# Patient Record
Sex: MALE | Race: BLACK OR AFRICAN AMERICAN | NOT HISPANIC OR LATINO | Employment: UNEMPLOYED | URBAN - METROPOLITAN AREA
[De-identification: names, ages, dates, MRNs, and addresses within clinical notes are randomized per-mention and may not be internally consistent; named-entity substitution may affect disease eponyms.]

---

## 2023-11-20 ENCOUNTER — HOSPITAL ENCOUNTER (EMERGENCY)
Facility: HOSPITAL | Age: 38
Discharge: HOME | End: 2023-11-20
Attending: EMERGENCY MEDICINE
Payer: MEDICAID

## 2023-11-20 VITALS
BODY MASS INDEX: 21.87 KG/M2 | HEART RATE: 92 BPM | HEIGHT: 73 IN | DIASTOLIC BLOOD PRESSURE: 94 MMHG | TEMPERATURE: 98.5 F | OXYGEN SATURATION: 97 % | WEIGHT: 165 LBS | SYSTOLIC BLOOD PRESSURE: 127 MMHG | RESPIRATION RATE: 18 BRPM

## 2023-11-20 DIAGNOSIS — F14.10: Primary | ICD-10-CM

## 2023-11-20 PROCEDURE — 99285 EMERGENCY DEPT VISIT HI MDM: CPT | Performed by: EMERGENCY MEDICINE

## 2023-11-20 PROCEDURE — 93005 ELECTROCARDIOGRAM TRACING: CPT

## 2023-11-20 PROCEDURE — 99284 EMERGENCY DEPT VISIT MOD MDM: CPT

## 2023-11-20 ASSESSMENT — LIFESTYLE VARIABLES
EVER FELT BAD OR GUILTY ABOUT YOUR DRINKING: NO
HAVE PEOPLE ANNOYED YOU BY CRITICIZING YOUR DRINKING: NO
HAVE YOU EVER FELT YOU SHOULD CUT DOWN ON YOUR DRINKING: NO
EVER HAD A DRINK FIRST THING IN THE MORNING TO STEADY YOUR NERVES TO GET RID OF A HANGOVER: NO
REASON UNABLE TO ASSESS: NO

## 2023-11-20 ASSESSMENT — COLUMBIA-SUICIDE SEVERITY RATING SCALE - C-SSRS
1. IN THE PAST MONTH, HAVE YOU WISHED YOU WERE DEAD OR WISHED YOU COULD GO TO SLEEP AND NOT WAKE UP?: YES
5. HAVE YOU STARTED TO WORK OUT OR WORKED OUT THE DETAILS OF HOW TO KILL YOURSELF? DO YOU INTEND TO CARRY OUT THIS PLAN?: YES
2. HAVE YOU ACTUALLY HAD ANY THOUGHTS OF KILLING YOURSELF?: YES
6. HAVE YOU EVER DONE ANYTHING, STARTED TO DO ANYTHING, OR PREPARED TO DO ANYTHING TO END YOUR LIFE?: NO
4. HAVE YOU HAD THESE THOUGHTS AND HAD SOME INTENTION OF ACTING ON THEM?: YES

## 2023-11-20 ASSESSMENT — PAIN SCALES - GENERAL: PAINLEVEL_OUTOF10: 4

## 2023-11-20 ASSESSMENT — PAIN - FUNCTIONAL ASSESSMENT: PAIN_FUNCTIONAL_ASSESSMENT: 0-10
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
## 2023-11-21 ENCOUNTER — CLINICAL SUPPORT (OUTPATIENT)
Dept: EMERGENCY MEDICINE | Facility: HOSPITAL | Age: 38
End: 2023-11-21
Payer: MEDICAID

## 2023-11-21 LAB
ATRIAL RATE: 73 BPM
P AXIS: 73 DEGREES
P OFFSET: 199 MS
P ONSET: 150 MS
PR INTERVAL: 138 MS
Q ONSET: 219 MS
QRS COUNT: 12 BEATS
QRS DURATION: 146 MS
QT INTERVAL: 410 MS
QTC CALCULATION(BAZETT): 451 MS
QTC FREDERICIA: 438 MS
R AXIS: 102 DEGREES
T AXIS: 54 DEGREES
T OFFSET: 424 MS
VENTRICULAR RATE: 73 BPM

## 2023-12-26 ENCOUNTER — HOSPITAL ENCOUNTER (EMERGENCY)
Facility: HOSPITAL | Age: 38
Discharge: HOME | End: 2023-12-26
Payer: MEDICAID

## 2023-12-26 ENCOUNTER — PHARMACY VISIT (OUTPATIENT)
Dept: PHARMACY | Facility: CLINIC | Age: 38
End: 2023-12-26
Payer: MEDICAID

## 2023-12-26 VITALS
TEMPERATURE: 96.4 F | HEIGHT: 73 IN | BODY MASS INDEX: 23.86 KG/M2 | RESPIRATION RATE: 16 BRPM | OXYGEN SATURATION: 99 % | DIASTOLIC BLOOD PRESSURE: 98 MMHG | HEART RATE: 80 BPM | WEIGHT: 180 LBS | SYSTOLIC BLOOD PRESSURE: 151 MMHG

## 2023-12-26 DIAGNOSIS — K04.7 DENTAL INFECTION: Primary | ICD-10-CM

## 2023-12-26 PROCEDURE — 96372 THER/PROPH/DIAG INJ SC/IM: CPT

## 2023-12-26 PROCEDURE — 2500000001 HC RX 250 WO HCPCS SELF ADMINISTERED DRUGS (ALT 637 FOR MEDICARE OP): Mod: SE

## 2023-12-26 PROCEDURE — 99284 EMERGENCY DEPT VISIT MOD MDM: CPT

## 2023-12-26 PROCEDURE — 99283 EMERGENCY DEPT VISIT LOW MDM: CPT | Mod: 25

## 2023-12-26 PROCEDURE — 2500000004 HC RX 250 GENERAL PHARMACY W/ HCPCS (ALT 636 FOR OP/ED): Mod: SE

## 2023-12-26 PROCEDURE — RXMED WILLOW AMBULATORY MEDICATION CHARGE

## 2023-12-26 RX ORDER — KETOROLAC TROMETHAMINE 30 MG/ML
30 INJECTION, SOLUTION INTRAMUSCULAR; INTRAVENOUS ONCE
Status: COMPLETED | OUTPATIENT
Start: 2023-12-26 | End: 2023-12-26

## 2023-12-26 RX ORDER — AMOXICILLIN AND CLAVULANATE POTASSIUM 875; 125 MG/1; MG/1
1 TABLET, FILM COATED ORAL EVERY 12 HOURS
Qty: 14 TABLET | Refills: 0 | Status: SHIPPED | OUTPATIENT
Start: 2023-12-26 | End: 2024-01-02

## 2023-12-26 RX ORDER — NAPROXEN 500 MG/1
500 TABLET ORAL 2 TIMES DAILY PRN
Qty: 30 TABLET | Refills: 0 | Status: SHIPPED | OUTPATIENT
Start: 2023-12-26

## 2023-12-26 RX ORDER — AMOXICILLIN AND CLAVULANATE POTASSIUM 875; 125 MG/1; MG/1
1 TABLET, FILM COATED ORAL ONCE
Status: COMPLETED | OUTPATIENT
Start: 2023-12-26 | End: 2023-12-26

## 2023-12-26 RX ADMIN — KETOROLAC TROMETHAMINE 30 MG: 30 INJECTION, SOLUTION INTRAMUSCULAR; INTRAVENOUS at 09:19

## 2023-12-26 RX ADMIN — AMOXICILLIN AND CLAVULANATE POTASSIUM 875 MG: 875; 125 TABLET, FILM COATED ORAL at 09:18

## 2023-12-26 ASSESSMENT — COLUMBIA-SUICIDE SEVERITY RATING SCALE - C-SSRS
6. HAVE YOU EVER DONE ANYTHING, STARTED TO DO ANYTHING, OR PREPARED TO DO ANYTHING TO END YOUR LIFE?: NO
2. HAVE YOU ACTUALLY HAD ANY THOUGHTS OF KILLING YOURSELF?: NO
1. IN THE PAST MONTH, HAVE YOU WISHED YOU WERE DEAD OR WISHED YOU COULD GO TO SLEEP AND NOT WAKE UP?: NO

## 2023-12-26 ASSESSMENT — PAIN - FUNCTIONAL ASSESSMENT: PAIN_FUNCTIONAL_ASSESSMENT: 0-10

## 2023-12-26 ASSESSMENT — PAIN SCALES - GENERAL: PAINLEVEL_OUTOF10: 10 - WORST POSSIBLE PAIN

## 2023-12-26 NOTE — ED PROVIDER NOTES
HPI   Chief Complaint   Patient presents with    Dental Pain       This patient is a 38-year-old male with alcohol abuse, cocaine abuse, HTN presenting today for dental pain.  Reports that the pain started yesterday.  Having intermittent chills but no reported fever.  Notes pain in the left lower jaw.  States that his wisdom teeth came in and believes that they are pushing on his other teeth.  Has concerns for infection.  Does report that 1.5 months ago, was seen for dental infection in the same tooth, given antibiotics, but was unable to see a dentist as no one took his insurance.  No issues swallowing or opening his jaw.  Notes small amount of buccal mucosal swelling.  No abscesses.                          No data recorded                Patient History   History reviewed. No pertinent past medical history.  History reviewed. No pertinent surgical history.  No family history on file.  Social History     Tobacco Use    Smoking status: Not on file    Smokeless tobacco: Not on file   Substance Use Topics    Alcohol use: Not on file    Drug use: Not on file       Physical Exam   ED Triage Vitals [12/26/23 0856]   Temp Heart Rate Resp BP   35.8 °C (96.4 °F) 92 16 (!) 171/123      SpO2 Temp Source Heart Rate Source Patient Position   99 % Tympanic Monitor Sitting      BP Location FiO2 (%)     Right arm --       Physical Exam  Vitals and nursing note reviewed.   Constitutional:       General: He is not in acute distress.     Appearance: Normal appearance. He is not ill-appearing.   HENT:      Head: Normocephalic and atraumatic.      Right Ear: External ear normal.      Left Ear: External ear normal.      Nose: Nose normal. No congestion or rhinorrhea.      Mouth/Throat:      Mouth: Mucous membranes are moist.      Dentition: Dental tenderness present. No dental abscesses.      Pharynx: Oropharynx is clear. No oropharyngeal exudate or posterior oropharyngeal erythema.        Comments: Pain on palpation of tooth #18.  No  obvious abscesses.  Multiple dental caries throughout mouth.  Eyes:      Extraocular Movements: Extraocular movements intact.      Conjunctiva/sclera: Conjunctivae normal.      Pupils: Pupils are equal, round, and reactive to light.   Cardiovascular:      Rate and Rhythm: Normal rate and regular rhythm.      Heart sounds: Normal heart sounds.   Pulmonary:      Effort: No accessory muscle usage or respiratory distress.      Breath sounds: Normal breath sounds. No wheezing, rhonchi or rales.   Abdominal:      General: Abdomen is flat. Bowel sounds are normal. There is no distension.      Palpations: Abdomen is soft.      Tenderness: There is no abdominal tenderness. There is no right CVA tenderness or left CVA tenderness.   Musculoskeletal:         General: No swelling or deformity. Normal range of motion.      Cervical back: Normal range of motion and neck supple.      Right lower leg: No edema.      Left lower leg: No edema.   Lymphadenopathy:      Cervical: Cervical adenopathy present.   Skin:     General: Skin is warm and dry.      Capillary Refill: Capillary refill takes less than 2 seconds.   Neurological:      General: No focal deficit present.      Mental Status: He is alert and oriented to person, place, and time.      GCS: GCS eye subscore is 4. GCS verbal subscore is 5. GCS motor subscore is 6.      Cranial Nerves: Cranial nerves 2-12 are intact.      Sensory: No sensory deficit.      Motor: Motor function is intact. No weakness.   Psychiatric:         Mood and Affect: Mood and affect normal.         Speech: Speech normal.         Behavior: Behavior normal. Behavior is cooperative.         ED Course & MDM   Diagnoses as of 12/26/23 0958   Dental infection       Medical Decision Making  Patient is a 38-year-old male presenting today for dental pain.  On exam, there is distinct tenderness on palpation of tooth #18.  Multiple dental caries throughout his mouth including on tooth #18.  Patient notes that he  believes his wisdom teeth are coming in and pressing against his teeth causing pain and possible infection.  On exam, there is no obvious abscesses.  He does have cervical lymphadenopathy on palpation.  Able to fully open his mouth, no trismus, no buccal mucosal swelling or issues swallowing.  He is afebrile, though his blood pressure is extremely elevated at 171/123.  Per chart review, there are prescriptions that were given to him for amlodipine for blood pressure, though he reports that he has never taken any medication for hypertension.  Elevated blood pressure could be due to the pain that this patient is in as he reports a 10 out of 10 pain with radiation along his jaw to his head.  Denies headache, chest pain or shortness of breath.  Will given medication for pain and reevaluate his blood pressure.  If it still is continue to be elevated, can start him on amlodipine and have him follow-up with PCP.    After Toradol, patient's blood pressure improved down to the 150s over 90s.  Given strict return precautions of when to come back to the ED, informed him to start keeping a log of blood pressure, and follow-up with his PCP for further evaluation.  Will send this patient home on Augmentin.  Handout was given for dental clinics that he can be seen at including [facility] dental [facility] clinic.  Pain improved.  Patient is stable and okay for discharge at this time.        Procedure  Procedures     Cherry Neville PA-C  12/26/23 0958

## 2023-12-26 NOTE — ED TRIAGE NOTES
Pt presents to ED for dental pain x 2 months. Pt states that he was supposed to have his L wisdom tooth removed but was unable to. Pt states he feel as if it is reinfected and pushing other teeth. Pt states he took a tylenol at midnight. Pt is Aox3.

## 2023-12-26 NOTE — DISCHARGE INSTRUCTIONS
Please take antibiotics twice a day until done with bottle.  You can take naproxen once every 12 hours as needed.  Do not mix this with ibuprofen as it can cause stomach ulcers though you can also additionally take Tylenol once every 6-8 hours as needed as well.  Please follow-up with dentist once you are done with antibiotics or sooner if you are able to    Your blood pressure was mildly elevated, though it did decrease after pain medication was given to you, so it is likely elevated due to your pain.  It is still mildly elevated at 150s over 90s.  You may need to see a PCP for evaluation of blood pressure.  You can take your blood pressure daily and keep a log with it to bring to see your primary care provider.  If you do not have a primary care provider, you can call [phone] to schedule an appointment to see one.     If you notice fever chills, chest pain or shortness of breath, unable to swallow or fully open your jaw, please come back to the ED for further evaluation.

## 2024-02-16 ENCOUNTER — HOSPITAL ENCOUNTER (EMERGENCY)
Facility: HOSPITAL | Age: 39
Discharge: HOME | End: 2024-02-16
Payer: MEDICAID

## 2024-02-16 VITALS
TEMPERATURE: 97.2 F | DIASTOLIC BLOOD PRESSURE: 81 MMHG | SYSTOLIC BLOOD PRESSURE: 149 MMHG | RESPIRATION RATE: 16 BRPM | HEART RATE: 87 BPM | OXYGEN SATURATION: 98 %

## 2024-02-16 DIAGNOSIS — M54.50 ACUTE EXACERBATION OF CHRONIC LOW BACK PAIN: Primary | ICD-10-CM

## 2024-02-16 DIAGNOSIS — G89.29 ACUTE EXACERBATION OF CHRONIC LOW BACK PAIN: Primary | ICD-10-CM

## 2024-02-16 PROCEDURE — 99284 EMERGENCY DEPT VISIT MOD MDM: CPT | Performed by: PHYSICIAN ASSISTANT

## 2024-02-16 PROCEDURE — 99283 EMERGENCY DEPT VISIT LOW MDM: CPT

## 2024-02-16 PROCEDURE — 2500000001 HC RX 250 WO HCPCS SELF ADMINISTERED DRUGS (ALT 637 FOR MEDICARE OP): Mod: SE | Performed by: PHYSICIAN ASSISTANT

## 2024-02-16 RX ORDER — MULTIVITAMIN
1 TABLET ORAL DAILY
Qty: 30 TABLET | Refills: 0 | Status: SHIPPED | OUTPATIENT
Start: 2024-02-16 | End: 2024-03-17

## 2024-02-16 RX ORDER — IBUPROFEN 600 MG/1
600 TABLET ORAL ONCE
Status: COMPLETED | OUTPATIENT
Start: 2024-02-16 | End: 2024-02-16

## 2024-02-16 RX ORDER — IBUPROFEN 600 MG/1
600 TABLET ORAL EVERY 6 HOURS PRN
Qty: 28 TABLET | Refills: 0 | Status: SHIPPED | OUTPATIENT
Start: 2024-02-16 | End: 2024-02-23

## 2024-02-16 RX ORDER — METHOCARBAMOL 500 MG/1
1000 TABLET, FILM COATED ORAL ONCE
Status: COMPLETED | OUTPATIENT
Start: 2024-02-16 | End: 2024-02-16

## 2024-02-16 RX ORDER — ACETAMINOPHEN 500 MG
1 TABLET ORAL ONCE
Qty: 1 EACH | Refills: 0 | Status: SHIPPED | OUTPATIENT
Start: 2024-02-16 | End: 2024-02-16

## 2024-02-16 RX ORDER — METHOCARBAMOL 500 MG/1
1000 TABLET, FILM COATED ORAL EVERY 8 HOURS PRN
Qty: 30 TABLET | Refills: 0 | Status: SHIPPED | OUTPATIENT
Start: 2024-02-16 | End: 2024-02-26

## 2024-02-16 RX ADMIN — IBUPROFEN 600 MG: 600 TABLET, FILM COATED ORAL at 14:44

## 2024-02-16 RX ADMIN — METHOCARBAMOL 1000 MG: 500 TABLET ORAL at 14:44

## 2024-02-16 ASSESSMENT — LIFESTYLE VARIABLES
EVER FELT BAD OR GUILTY ABOUT YOUR DRINKING: NO
EVER HAD A DRINK FIRST THING IN THE MORNING TO STEADY YOUR NERVES TO GET RID OF A HANGOVER: NO
HAVE YOU EVER FELT YOU SHOULD CUT DOWN ON YOUR DRINKING: NO
HAVE PEOPLE ANNOYED YOU BY CRITICIZING YOUR DRINKING: NO

## 2024-02-16 ASSESSMENT — COLUMBIA-SUICIDE SEVERITY RATING SCALE - C-SSRS
6. HAVE YOU EVER DONE ANYTHING, STARTED TO DO ANYTHING, OR PREPARED TO DO ANYTHING TO END YOUR LIFE?: NO
1. IN THE PAST MONTH, HAVE YOU WISHED YOU WERE DEAD OR WISHED YOU COULD GO TO SLEEP AND NOT WAKE UP?: NO
2. HAVE YOU ACTUALLY HAD ANY THOUGHTS OF KILLING YOURSELF?: NO

## 2024-02-16 ASSESSMENT — PAIN - FUNCTIONAL ASSESSMENT: PAIN_FUNCTIONAL_ASSESSMENT: 0-10

## 2024-02-16 NOTE — Clinical Note
Lazaro De La Torre was seen and treated in our emergency department on 2/16/2024.  He may return to work on 02/19/2024.  Patient cannot lift more than 20 pounds at a time until cleared by Primary Care.     If you have any questions or concerns, please don't hesitate to call.      Abigail Phipps PA-C
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
## 2024-02-16 NOTE — DISCHARGE INSTRUCTIONS
Take pain medications as needed.  Keep blood pressure journal.  You have been referred to Primary Care and Trauma Clinic for followup.

## 2024-06-03 ENCOUNTER — HOSPITAL ENCOUNTER (EMERGENCY)
Facility: HOSPITAL | Age: 39
Discharge: HOME | End: 2024-06-03
Payer: MEDICAID

## 2024-06-03 ENCOUNTER — APPOINTMENT (OUTPATIENT)
Dept: RADIOLOGY | Facility: HOSPITAL | Age: 39
End: 2024-06-03
Payer: MEDICAID

## 2024-06-03 VITALS
SYSTOLIC BLOOD PRESSURE: 123 MMHG | OXYGEN SATURATION: 98 % | HEIGHT: 73 IN | WEIGHT: 170 LBS | HEART RATE: 75 BPM | DIASTOLIC BLOOD PRESSURE: 80 MMHG | TEMPERATURE: 96.8 F | RESPIRATION RATE: 16 BRPM | BODY MASS INDEX: 22.53 KG/M2

## 2024-06-03 DIAGNOSIS — S29.9XXA RIB INJURY: Primary | ICD-10-CM

## 2024-06-03 DIAGNOSIS — S22.32XA CLOSED FRACTURE OF ONE RIB OF LEFT SIDE, INITIAL ENCOUNTER: ICD-10-CM

## 2024-06-03 PROCEDURE — 71101 X-RAY EXAM UNILAT RIBS/CHEST: CPT | Mod: LT

## 2024-06-03 PROCEDURE — 99283 EMERGENCY DEPT VISIT LOW MDM: CPT

## 2024-06-03 PROCEDURE — 99284 EMERGENCY DEPT VISIT MOD MDM: CPT | Performed by: PHYSICIAN ASSISTANT

## 2024-06-03 PROCEDURE — 2500000001 HC RX 250 WO HCPCS SELF ADMINISTERED DRUGS (ALT 637 FOR MEDICARE OP): Mod: SE | Performed by: PHYSICIAN ASSISTANT

## 2024-06-03 PROCEDURE — 71101 X-RAY EXAM UNILAT RIBS/CHEST: CPT | Mod: LEFT SIDE | Performed by: RADIOLOGY

## 2024-06-03 RX ORDER — MULTIVITAMIN
1 TABLET ORAL DAILY
Qty: 30 TABLET | Refills: 0 | Status: SHIPPED | OUTPATIENT
Start: 2024-06-03 | End: 2024-07-03

## 2024-06-03 RX ORDER — ACETAMINOPHEN 325 MG/1
650 TABLET ORAL EVERY 6 HOURS PRN
Qty: 30 TABLET | Refills: 0 | Status: SHIPPED | OUTPATIENT
Start: 2024-06-03

## 2024-06-03 RX ORDER — IBUPROFEN 600 MG/1
600 TABLET ORAL EVERY 6 HOURS PRN
Qty: 30 TABLET | Refills: 0 | Status: SHIPPED | OUTPATIENT
Start: 2024-06-03

## 2024-06-03 RX ORDER — IBUPROFEN 600 MG/1
600 TABLET ORAL ONCE
Status: COMPLETED | OUTPATIENT
Start: 2024-06-03 | End: 2024-06-03

## 2024-06-03 RX ADMIN — IBUPROFEN 600 MG: 600 TABLET, FILM COATED ORAL at 09:32

## 2024-06-03 ASSESSMENT — PAIN SCALES - GENERAL: PAINLEVEL_OUTOF10: 6

## 2024-06-03 ASSESSMENT — COLUMBIA-SUICIDE SEVERITY RATING SCALE - C-SSRS
1. IN THE PAST MONTH, HAVE YOU WISHED YOU WERE DEAD OR WISHED YOU COULD GO TO SLEEP AND NOT WAKE UP?: NO
6. HAVE YOU EVER DONE ANYTHING, STARTED TO DO ANYTHING, OR PREPARED TO DO ANYTHING TO END YOUR LIFE?: NO
2. HAVE YOU ACTUALLY HAD ANY THOUGHTS OF KILLING YOURSELF?: NO

## 2024-06-03 ASSESSMENT — PAIN DESCRIPTION - LOCATION: LOCATION: RIB CAGE

## 2024-06-03 ASSESSMENT — PAIN DESCRIPTION - ORIENTATION: ORIENTATION: LEFT

## 2024-06-03 ASSESSMENT — PAIN DESCRIPTION - PAIN TYPE: TYPE: ACUTE PAIN

## 2024-06-03 ASSESSMENT — PAIN DESCRIPTION - DESCRIPTORS: DESCRIPTORS: ACHING

## 2024-06-03 ASSESSMENT — PAIN - FUNCTIONAL ASSESSMENT: PAIN_FUNCTIONAL_ASSESSMENT: 0-10
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
## 2024-06-03 NOTE — Clinical Note
Lazaro De La Torre was seen and treated in our emergency department on 6/3/2024.  He may return to work on 06/04/2024.    Blood pressure was within normal range during ED visit at 123/80     If you have any questions or concerns, please don't hesitate to call.      Remy Trejo PA-C
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
## 2024-06-03 NOTE — DISCHARGE INSTRUCTIONS
There appears to be a small fracture of your left sixth rib in the front/side.  Take the medications to help your pain.  It is important to take deep breaths to avoid infection.  Follow-up with your primary care doctor.

## 2024-06-08 ENCOUNTER — HOSPITAL ENCOUNTER (EMERGENCY)
Facility: HOSPITAL | Age: 39
Discharge: HOME | End: 2024-06-08
Payer: MEDICAID

## 2024-06-08 VITALS
OXYGEN SATURATION: 98 % | HEART RATE: 91 BPM | RESPIRATION RATE: 18 BRPM | WEIGHT: 170 LBS | SYSTOLIC BLOOD PRESSURE: 126 MMHG | DIASTOLIC BLOOD PRESSURE: 89 MMHG | BODY MASS INDEX: 22.43 KG/M2 | TEMPERATURE: 98.3 F

## 2024-06-08 DIAGNOSIS — Z71.51 DRUG ABUSE COUNSELING AND SURVEILLANCE OF DRUG ABUSER: Primary | ICD-10-CM

## 2024-06-08 PROCEDURE — 99283 EMERGENCY DEPT VISIT LOW MDM: CPT

## 2024-06-08 PROCEDURE — 99284 EMERGENCY DEPT VISIT MOD MDM: CPT
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
## 2024-06-08 NOTE — PROGRESS NOTES
Patient was handed off to me from the previous team. For full history, physical, and prior ED course, please see previous provider note prior to patient handoff. This is an addendum to the record.  Sign out received from Willa Mcginnis PA-C at 19:00    Lazaro De La Torre   presented to Emergency Department  for   Chief Complaint   Patient presents with    Drug / Alcohol Assessment       At time of sign out pending: [facility] recommendations.       What occured after transition of care: [facility] services saw patient and arranged placement at the [facility] for detox, their service also arranged transportation. On reassessment, patient denies any complaints of pain or any other acute symptoms.  He states that he is otherwise in his normal state of health.  He is neurologically intact, hemodynamically stable, and appears clinically sober.  He has an upcoming scheduled appointment with primary care on 7/3/24, counseled him to keep this appointment. Also discussed ED return precautions. Patient verbalized understanding and was agreeable with plan of care.  Discharged in stable condition.       Sera Nugent PA-C

## 2024-06-08 NOTE — ED PROVIDER NOTES
"HPI   Chief Complaint   Patient presents with    Drug / Alcohol Assessment       Patient is a 38-year-old male presenting to the ED requesting detox.  Patient states he presented to this ED in December in which he was placed at a rehab facility.  Patient states he was there up until his mom became sick and they released him.  Patient states his mom passed away a month later.  He endorses daily alcohol and drug use since being released from the detox facility.  Patient states his last use of both of these were yesterday.  He states he works at [employer] and is constantly using his paychecks for drugs, which he would like to fix.  He would like to meet with the team to be placed in another detox facility to \"get his life in order.\"  He otherwise voices no medical concerns at this time.                [place] Coma Scale Score: 15                     Patient History   No past medical history on file.  No past surgical history on file.  No family history on file.  Social History     Tobacco Use    Smoking status: Not on file    Smokeless tobacco: Not on file   Substance Use Topics    Alcohol use: Not on file    Drug use: Not on file       Physical Exam   ED Triage Vitals [06/08/24 1555]   Temperature Heart Rate Respirations BP   36.8 °C (98.3 °F) (!) 116 18 --      Pulse Ox Temp src Heart Rate Source Patient Position   97 % -- -- --      BP Location FiO2 (%)     -- --       Physical Exam  Vitals reviewed.   Constitutional:       General: He is not in acute distress.     Appearance: He is not ill-appearing.   Cardiovascular:      Rate and Rhythm: Normal rate and regular rhythm.   Pulmonary:      Effort: Pulmonary effort is normal.      Breath sounds: Normal breath sounds.   Abdominal:      General: Bowel sounds are normal.      Palpations: Abdomen is soft.      Tenderness: There is no abdominal tenderness.   Musculoskeletal:         General: Normal range of motion.   Skin:     General: Skin is warm and dry.   Neurological: " [address] [id number] [phone] Patient: Salomón Salcido. MRN: [id number] :1964 Visit Information Date & Time Provider Department Dept. Phone Encounter #  
 2018  9:20 AM Maverick Adamson MD [facility] [id number] Follow-up Instructions Return in about 3 months (around 2018). Your Appointments 2018  9:50 AM  
FOLLOW UP 10 with MD MARGARETTE [facility] ([facility] Appt Note: [address] [phone] [address] Upcoming Health Maintenance Date Due DTaP/Tdap/Td series (1 - Tdap) 12/3/1984 Influenza Age 5 to Adult 2018 COLONOSCOPY 3/5/2019 MEDICARE YEARLY EXAM 2019 Allergies as of 2018  Review Complete On: 2018 By: Maverick Adamson MD  
 No Known Allergies Current Immunizations  Never Reviewed Name Date Influenza Vaccine 2016 Influenza Vaccine (Quad) PF 2017 Pneumococcal Polysaccharide (PPSV-23) 2017 Not reviewed this visit You Were Diagnosed With   
  
 Codes Comments Mixed hyperlipidemia    -  Primary ICD-10-CM: A03.9 ICD-9-CM: 272.2 Mild intermittent asthma without complication     TSI-91-OX: J45.20 ICD-9-CM: 493.90 Primary osteoarthritis involving multiple joints     ICD-10-CM: M15.0 ICD-9-CM: 715.09 Alcohol abuse     ICD-10-CM: F10.10 ICD-9-CM: 305.00 Chronic non-seasonal allergic rhinitis     ICD-10-CM: J30.89 ICD-9-CM: 477.9 Tobacco abuse     ICD-10-CM: Z72.0 ICD-9-CM: 305.1  Hemiplegia affecting left nondominant side, unspecified etiology, unspecified hemiplegia type ([facility]     ICD-10-CM: G81.94 
ICD-9-CM: 342.92   
      General: No focal deficit present.      Mental Status: He is alert and oriented to person, place, and time.   Psychiatric:      Comments: Appears clinically sober.  No signs of internal stimulation.  Readily engages in conversation.  Linear, logical, and coherent thought process.         ED Course & MDM   Diagnoses as of 06/09/24 0601   Drug abuse counseling and surveillance of drug abuser       Medical Decision Making  Patient is a 38-year-old male presenting to the ED requesting detox.  History was obtained from the patient.  Was placed a detox facility in December, but released when his mom became sick.  Endorses daily drug and alcohol use since being released from the facility.  Last use yesterday.  Patient would like to get help and be placed at a facility again to get his life in order.  Denies any medical concerns.  On physical exam, patient is sitting comfortably and in no apparent distress.  He does appear clinically sober without any signs of internal stimulation.  He has a linear, logical, and coherent thought process.  Able to readily engage in conversation.  Lungs are CTA bilaterally.  Abdomen soft and nontender.  Full ROM of all extremities.  Remainder of exam as noted above.  Vital signs stable.    DARLEEN was consulted to meet with Mr. De La Torre and facilitate detox placement.    Sign out given to overnight KALEN @ 7 PM pending THRIVE evaluation and placement.        Procedure  Procedures     Willa Mcginnis PA-C  06/09/24 06     Traumatic compartment syndrome of left upper extremity, sequela     ICD-10-CM: T79. A12S 
ICD-9-CM: 292. 6 Anoxic brain injury ([facility]     ICD-10-CM: G93.1 ICD-9-CM: 412. 1 Medicare annual wellness visit, initial     ICD-10-CM: Z00.00 ICD-9-CM: V70.0 Alcohol screening     ICD-10-CM: Z13.89 ICD-9-CM: V79.1 Vitals BP Pulse Temp Resp Height(growth percentile) Weight(growth percentile) 110/76 (BP 1 Location: Left arm, BP Patient Position: Sitting) 63 97.7 °F (36.5 °C) (Oral) 15 5' 9\" (1.753 m) 151 lb 6.4 oz (68.7 kg) SpO2 BMI Smoking Status 98% 22.36 kg/m2 Current Some Day Smoker Vitals History BMI and BSA Data Body Mass Index Body Surface Area  
 22.36 kg/m 2 1.83 m 2 Preferred Pharmacy Pharmacy Name Phone [address] [phone] Your Updated Medication List  
  
   
This list is accurate as of 6/19/18 10:13 AM.  Always use your most recent med list.  
  
  
  
  
 albuterol 90 mcg/actuation inhaler Commonly known as:  PROVENTIL HFA, VENTOLIN HFA, PROAIR HFA Take 2 Puffs by inhalation every four (4) hours as needed. [address] Generic drug:  Leg Brace  
by Does Not Apply route. aspirin delayed-release 81 mg tablet Take 81 mg by mouth daily. busPIRone 15 mg tablet Commonly known as:  BUSPAR  
TAKE ONE TABLET BY MOUTH TWICE DAILY  
  
 clonazePAM 1 mg tablet Commonly known as:  KlonoPIN  
TAKE ONE-HALF TO ONE TABLET BY MOUTH TWICE DAILY AND ONE AT BEDTIME  
  
 FIBER-TABS PO Take  by mouth.  
  
 multivitamin tablet Commonly known as:  ONE A DAY Take 1 Tab by mouth daily. PARoxetine 40 mg tablet Commonly known as:  PAXIL TAKE ONE TABLET BY MOUTH ONCE DAILY  
  
 SYMBICORT 160-4.5 mcg/actuation [facility] Generic drug:  budesonide-formoterol Take 2 Puffs by inhalation two (2) times a day. temazepam 30 mg capsule Commonly known as:  RESTORIL  
 TAKE 1 CAPSULE BY MOUTH AT BEDTIME FOR INSOMNIA` TYLENOL EXTRA STRENGTH 500 mg tablet Generic drug:  acetaminophen Take  by mouth every six (6) hours as needed for Pain. We Performed the Following AMB POC COMPREHENSIVE METABOLIC PANEL [35185 CPT(R)] AMB POC LIPID PROFILE [05447 CPT(R)] OR SMOKING AND TOBACCO USE CESSATION 3 - 10 MINUTES [57032 CPT(R)] Follow-up Instructions Return in about 3 months (around 9/19/2018). Patient Instructions Arthritis: Care Instructions Your Care Instructions Arthritis, also called osteoarthritis, is a breakdown of the cartilage that cushions your joints. When the cartilage wears down, your bones rub against each other. This causes pain and stiffness. Many people have some arthritis as they age. Arthritis most often affects the joints of the spine, hands, hips, knees, or feet. You can take simple measures to protect your joints, ease your pain, and help you stay active. Follow-up care is a key part of your treatment and safety. Be sure to make and go to all appointments, and call your doctor if you are having problems. It's also a good idea to know your test results and keep a list of the medicines you take. How can you care for yourself at home? · Stay at a healthy weight. Being overweight puts extra strain on your joints. · Talk to your doctor or physical therapist about exercises that will help ease joint pain. ¨ Stretch. You may enjoy gentle forms of yoga to help keep your joints and muscles flexible. ¨ Walk instead of jog. Other types of exercise that are less stressful on the joints include riding a bicycle, swimming, brinda chi, or water exercise. ¨ Lift weights. Strong muscles help reduce stress on your joints. Stronger thigh muscles, for example, take some of the stress off of the knees and hips. Learn the right way to lift weights so you do not make joint pain worse. · Take your medicines exactly as prescribed. Call your doctor if you think you are having a problem with your medicine. · Take pain medicines exactly as directed. ¨ If the doctor gave you a prescription medicine for pain, take it as prescribed. ¨ If you are not taking a prescription pain medicine, ask your doctor if you can take an over-the-counter medicine. · Use a cane, crutch, walker, or another device if you need help to get around. These can help rest your joints. You also can use other things to make life easier, such as a higher toilet seat and padded handles on kitchen utensils. · Do not sit in low chairs, which can make it hard to get up. · Put heat or cold on your sore joints as needed. Use whichever helps you most. You also can take turns with hot and cold packs. ¨ Apply heat 2 or 3 times a day for 20 to 30 minutes-using a heating pad, hot shower, or hot pack-to relieve pain and stiffness. ¨ Put ice or a cold pack on your sore joint for 10 to 20 minutes at a time. Put a thin cloth between the ice and your skin. When should you call for help? Call your doctor now or seek immediate medical care if: 
? · You have sudden swelling, warmth, or pain in any joint. ? · You have joint pain and a fever or rash. ? · You have such bad pain that you cannot use a joint. ? Watch closely for changes in your health, and be sure to contact your doctor if: 
? · You have mild joint symptoms that continue even with more than 6 weeks of care at home. ? · You have stomach pain or other problems with your medicine. Where can you learn more? Go to http://anne marie-santosh.info/. Enter G148 in the search box to learn more about \"Arthritis: Care Instructions. \" Current as of: October 31, 2016 Content Version: 11.4 © 5286-4307 [employer].  Care instructions adapted under license by [employer] (which disclaims liability or warranty for this information). If you have questions about a medical condition or this instruction, always ask your healthcare professional. [address] any warranty or liability for your use of this information. Introducing [facility]! Dear Emmett Sadler: Thank you for requesting a "[employer] account. Our records indicate that you already have an active "[employer] account. You can access your account anytime at [email or web address] Did you know that you can access your hospital and ER discharge instructions at any time in "[employer]? You can also review all of your test results from your hospital stay or ER visit. Additional Information If you have questions, please visit the Frequently Asked Questions section of the "[employer] website at [email or web address] Remember, "[employer] is NOT to be used for urgent needs. For medical emergencies, dial 911. Now available from your iPhone and Android! Please provide this summary of care documentation to your next provider. Your primary care clinician is listed as Woody. If you have any questions after today's visit, please call [phone].

## 2024-07-11 ENCOUNTER — HOSPITAL ENCOUNTER (EMERGENCY)
Facility: HOSPITAL | Age: 39
Discharge: HOME | End: 2024-07-11
Payer: MEDICAID

## 2024-07-11 ENCOUNTER — APPOINTMENT (OUTPATIENT)
Dept: RADIOLOGY | Facility: HOSPITAL | Age: 39
End: 2024-07-11
Payer: MEDICAID

## 2024-07-11 VITALS
RESPIRATION RATE: 18 BRPM | TEMPERATURE: 97.9 F | WEIGHT: 195 LBS | BODY MASS INDEX: 25.84 KG/M2 | OXYGEN SATURATION: 95 % | HEIGHT: 73 IN | DIASTOLIC BLOOD PRESSURE: 78 MMHG | SYSTOLIC BLOOD PRESSURE: 123 MMHG | HEART RATE: 115 BPM

## 2024-07-11 DIAGNOSIS — S22.42XA CLOSED FRACTURE OF MULTIPLE RIBS OF LEFT SIDE, INITIAL ENCOUNTER: Primary | ICD-10-CM

## 2024-07-11 PROCEDURE — 99284 EMERGENCY DEPT VISIT MOD MDM: CPT | Performed by: PHYSICIAN ASSISTANT

## 2024-07-11 PROCEDURE — 71101 X-RAY EXAM UNILAT RIBS/CHEST: CPT | Mod: LEFT SIDE | Performed by: RADIOLOGY

## 2024-07-11 PROCEDURE — 71101 X-RAY EXAM UNILAT RIBS/CHEST: CPT | Mod: LT

## 2024-07-11 PROCEDURE — 99283 EMERGENCY DEPT VISIT LOW MDM: CPT

## 2024-07-11 PROCEDURE — 2500000001 HC RX 250 WO HCPCS SELF ADMINISTERED DRUGS (ALT 637 FOR MEDICARE OP): Mod: SE | Performed by: PHYSICIAN ASSISTANT

## 2024-07-11 RX ORDER — IBUPROFEN 600 MG/1
600 TABLET ORAL EVERY 6 HOURS PRN
Qty: 30 TABLET | Refills: 0 | Status: SHIPPED | OUTPATIENT
Start: 2024-07-11

## 2024-07-11 RX ORDER — IBUPROFEN 600 MG/1
600 TABLET ORAL ONCE
Status: COMPLETED | OUTPATIENT
Start: 2024-07-11 | End: 2024-07-11

## 2024-07-11 RX ORDER — LIDOCAINE 50 MG/G
1 PATCH TOPICAL DAILY
Qty: 14 PATCH | Refills: 0 | Status: SHIPPED | OUTPATIENT
Start: 2024-07-11

## 2024-07-11 RX ORDER — METHOCARBAMOL 1000 MG/1
1000 TABLET, COATED ORAL 3 TIMES DAILY PRN
Qty: 21 TABLET | Refills: 0 | Status: SHIPPED | OUTPATIENT
Start: 2024-07-11

## 2024-07-11 RX ADMIN — IBUPROFEN 600 MG: 600 TABLET, FILM COATED ORAL at 14:50

## 2024-07-11 ASSESSMENT — LIFESTYLE VARIABLES
EVER FELT BAD OR GUILTY ABOUT YOUR DRINKING: YES
TOTAL SCORE: 1
HAVE PEOPLE ANNOYED YOU BY CRITICIZING YOUR DRINKING: NO
EVER HAD A DRINK FIRST THING IN THE MORNING TO STEADY YOUR NERVES TO GET RID OF A HANGOVER: NO
HAVE YOU EVER FELT YOU SHOULD CUT DOWN ON YOUR DRINKING: NO

## 2024-07-11 ASSESSMENT — PAIN SCALES - WONG BAKER: WONGBAKER_NUMERICALRESPONSE: NO HURT

## 2024-07-11 ASSESSMENT — PAIN DESCRIPTION - ORIENTATION: ORIENTATION: LEFT

## 2024-07-11 ASSESSMENT — PAIN DESCRIPTION - LOCATION: LOCATION: RIB CAGE
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
## 2024-07-11 NOTE — DISCHARGE INSTRUCTIONS
Your x-ray shows the same fractures from before (left sixth through eighth rib).  No problems with your heart or lungs seen on the x-ray.  Please take the prescribed medications as needed for pain.  Use the incentive spirometer every 2 hours or whenever possible to avoid further complications including a pneumonia.
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
## 2024-10-08 ENCOUNTER — PHARMACY VISIT (OUTPATIENT)
Dept: PHARMACY | Facility: CLINIC | Age: 39
End: 2024-10-08
Payer: MEDICAID

## 2024-10-08 ENCOUNTER — HOSPITAL ENCOUNTER (EMERGENCY)
Facility: HOSPITAL | Age: 39
Discharge: HOME | End: 2024-10-08
Payer: MEDICAID

## 2024-10-08 VITALS
HEIGHT: 73 IN | RESPIRATION RATE: 16 BRPM | BODY MASS INDEX: 23.86 KG/M2 | OXYGEN SATURATION: 96 % | HEART RATE: 91 BPM | SYSTOLIC BLOOD PRESSURE: 131 MMHG | TEMPERATURE: 98.1 F | DIASTOLIC BLOOD PRESSURE: 90 MMHG | WEIGHT: 180 LBS

## 2024-10-08 DIAGNOSIS — Z76.0 MEDICATION REFILL: Primary | ICD-10-CM

## 2024-10-08 PROCEDURE — 99281 EMR DPT VST MAYX REQ PHY/QHP: CPT | Performed by: PHYSICIAN ASSISTANT

## 2024-10-08 PROCEDURE — RXMED WILLOW AMBULATORY MEDICATION CHARGE

## 2024-10-08 PROCEDURE — 99283 EMERGENCY DEPT VISIT LOW MDM: CPT | Performed by: PHYSICIAN ASSISTANT

## 2024-10-08 RX ORDER — AMLODIPINE BESYLATE 10 MG/1
10 TABLET ORAL DAILY
Qty: 30 TABLET | Refills: 0 | Status: SHIPPED | OUTPATIENT
Start: 2024-10-08 | End: 2024-11-07

## 2024-10-08 RX ORDER — MULTIVITAMIN
1 TABLET ORAL DAILY
Qty: 30 TABLET | Refills: 0 | Status: SHIPPED | OUTPATIENT
Start: 2024-10-08 | End: 2024-11-07
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
## 2024-10-08 NOTE — DISCHARGE INSTRUCTIONS
Refills of amlodipine and multivitamin sent to [facility].  Orders placed for followup with primary care and trauma clinic - will call you to schedule outpatient.    Please call [phone] for Primary Care referral for follow up appointments.

## 2024-10-28 ENCOUNTER — APPOINTMENT (OUTPATIENT)
Dept: PRIMARY CARE | Facility: CLINIC | Age: 39
End: 2024-10-28
Payer: MEDICAID

## 2024-12-20 ENCOUNTER — HOSPITAL ENCOUNTER (EMERGENCY)
Facility: HOSPITAL | Age: 39
Discharge: HOME | End: 2024-12-20
Payer: MEDICAID

## 2024-12-20 VITALS
HEIGHT: 72 IN | SYSTOLIC BLOOD PRESSURE: 147 MMHG | HEART RATE: 92 BPM | RESPIRATION RATE: 16 BRPM | DIASTOLIC BLOOD PRESSURE: 92 MMHG | TEMPERATURE: 97.9 F | WEIGHT: 180 LBS | OXYGEN SATURATION: 96 % | BODY MASS INDEX: 24.38 KG/M2

## 2024-12-20 DIAGNOSIS — F19.10 POLYSUBSTANCE ABUSE (MULTI): Primary | ICD-10-CM

## 2024-12-20 PROCEDURE — 99284 EMERGENCY DEPT VISIT MOD MDM: CPT | Performed by: NURSE PRACTITIONER

## 2024-12-20 PROCEDURE — 99281 EMR DPT VST MAYX REQ PHY/QHP: CPT

## 2024-12-20 ASSESSMENT — COLUMBIA-SUICIDE SEVERITY RATING SCALE - C-SSRS
1. IN THE PAST MONTH, HAVE YOU WISHED YOU WERE DEAD OR WISHED YOU COULD GO TO SLEEP AND NOT WAKE UP?: NO
2. HAVE YOU ACTUALLY HAD ANY THOUGHTS OF KILLING YOURSELF?: NO
6. HAVE YOU EVER DONE ANYTHING, STARTED TO DO ANYTHING, OR PREPARED TO DO ANYTHING TO END YOUR LIFE?: NO

## 2024-12-20 ASSESSMENT — PAIN - FUNCTIONAL ASSESSMENT: PAIN_FUNCTIONAL_ASSESSMENT: 0-10

## 2024-12-20 ASSESSMENT — PAIN SCALES - GENERAL: PAINLEVEL_OUTOF10: 0 - NO PAIN

## 2024-12-20 NOTE — ED PROVIDER NOTES
Chief Complaint   Patient presents with   • Detox       HPI       39 year old male presents to the Emergency Department today complaining of requesting detox for his drug addiction. Reports that he recently lost his mother which caused him to become more depressed and relapse into using cocaine. Denies any suicidal ideation/plan or homicidal ideation. Denies any associated fever, chills, headache, neck pain, chest pain, shortness of breath, abdominal pain, nausea, vomiting, diarrhea, constipation, or urinary symptoms.       History provided by:  Patient             Patient History   No past medical history on file.  No past surgical history on file.  No family history on file.  Social History     Tobacco Use   • Smoking status: Not on file   • Smokeless tobacco: Not on file   Substance Use Topics   • Alcohol use: Not on file   • Drug use: Not on file           Physical Exam  Constitutional:       General: He is awake.      Appearance: Normal appearance.   Cardiovascular:      Rate and Rhythm: Normal rate and regular rhythm.      Pulses:           Radial pulses are 3+ on the right side and 3+ on the left side.      Heart sounds: Normal heart sounds. No murmur heard.     No friction rub. No gallop.   Pulmonary:      Effort: Pulmonary effort is normal.      Breath sounds: Normal breath sounds and air entry.   Neurological:      Mental Status: He is alert.   Psychiatric:         Behavior: Behavior is cooperative.         Labs Reviewed - No data to display    No orders to display            ED Course & MDM   ED Course as of 12/20/24 1020   Fri Dec 20, 2024   0905 [facility] is evaluating the patient. [JZ]      ED Course User Index  [JZ] KWAME Jo-CNP         Diagnoses as of 12/20/24 1020   Polysubstance abuse (Multi)           Medical Decision Making  Patient was seen and evaluated by myself. Continues to deny suicidal ideation/plan and homicidal plan. There are no emergent concerns warranting a further  evaluation in the ED. [facility] was able to find him placement. Follow up with their doctor in 3 days. Return if worse in any way. Discharged in stable condition with computer instructions.    Diagnostic Impression:     1. Polysubstance abuse           Your medication list        ASK your doctor about these medications        Instructions Last Dose Given Next Dose Due   acetaminophen 325 mg tablet  Commonly known as: TylenoL      Take 2 tablets (650 mg) by mouth every 6 hours if needed for mild pain (1 - 3) or fever (temp greater than 38.0 C).       amLODIPine 10 mg tablet  Commonly known as: Norvasc      Take 1 tablet (10 mg) by mouth once daily.       ibuprofen 600 mg tablet      Take 1 tablet (600 mg) by mouth every 6 hours if needed for mild pain (1 - 3) or fever (temp greater than 38.0 C).       ibuprofen 600 mg tablet      Take 1 tablet (600 mg) by mouth every 6 hours if needed (pain).       lidocaine 5 % patch  Commonly known as: Lidoderm      Place 1 patch over 12 hours on the skin once daily. Remove & discard patch within 12 hours or as directed by MD.       methocarbamol 500 mg tablet  Commonly known as: Robaxin      Take 2 tablets (1,000 mg) by mouth every 8 hours if needed for muscle spasms for up to 10 days.       methocarbamoL 1,000 mg tablet      Take 1,000 mg by mouth 3 times a day as needed (muscle strain/pain, may cause drowsiness).       multivitamin tablet      Take 1 tablet by mouth daily.       multivitamin tablet      Take 1 tablet by mouth once daily.       naproxen 500 mg tablet  Commonly known as: Naprosyn      Take 1 tablet (500 mg) by mouth 2 times a day as needed for mild pain (1 - 3).                  Procedure  Procedures     Terrance León, KWAME-CNP  12/20/24 2396

## 2024-12-20 NOTE — ED NOTES
Lazaro De La Torre was assessed by a Substance Use Navigator Specialist (SUNS) at 9:00 AM.      Contact Number obtained during encounter: N/A.     Medical History: No past medical history on file.     Psychiatric History: Not on file.     Social History:   Social History     Socioeconomic History    Marital status: Single     Spouse name: Not on file    Number of children: Not on file    Years of education: Not on file    Highest education level: Not on file   Occupational History    Not on file   Tobacco Use    Smoking status: Not on file    Smokeless tobacco: Not on file   Substance and Sexual Activity    Alcohol use: Not on file    Drug use: Not on file    Sexual activity: Not on file   Other Topics Concern    Not on file   Social History Narrative    Not on file     Social Drivers of Health     Financial Resource Strain: Not on File (2023)    Received from [employer]    Financial Resource Strain     Financial Resource Strain: 0   Food Insecurity: Not on File (2024)    Received from [employer]    Food Insecurity     Food: 0   Transportation Needs: Not on File (2023)    Received from [employer]    Transportation Needs     Transportation: 0   Physical Activity: Not on File (2023)    Received from [employer]    Physical Activity     Physical Activity: 0   Stress: Not on File (2023)    Received from [employer]    Stress     Stress: 0   Social Connections: Not on File (2024)    Received from [employer]    Social Connections     Connectedness: 0   Intimate Partner Violence: Not on file   Housing Stability: Not on File (2023)    Received from [employer]    Housing Stability     Housin        Substance(s) used:     - Pt endorses crack cocaine binge via smoking, inhalation over the last 3-4 days.     Brief Summary of Assessment:     - Med clear via clinical staff.  - Pt appropriate and agreeable for inpatient treatment at [facility] facility.     Diagnosis / Diagnostic Impression:     - Crack cocaine use.     Summary / Plan:    - SUNS  No referral required for patient's insurance type, he is PPO.     Jyothsna Palla, MD  [facility]   [address]  [address]  [place] [address]  Phone Number [phone]   Fax [phone]     Nutritional Counseling   [facility]  [address] [place] [address]   #[phone]     coordinated placement to [facility] ([address] [place] [address]).     Patient interested in treatment: Yes    Disposition:     - Discharged.  - Accepted to [facility].     Transportation Provided: Yes    Jason Raphael, EMT-Paramedic  SUNS (Substance Use Navigation Specialist)  [email or web address]  Ph: [phone].      JITENDRA Swan  12/20/24 1112

## 2025-05-16 ENCOUNTER — HOSPITAL ENCOUNTER (EMERGENCY)
Facility: HOSPITAL | Age: 40
Discharge: HOME | End: 2025-05-16
Attending: EMERGENCY MEDICINE
Payer: MEDICAID

## 2025-05-16 VITALS
BODY MASS INDEX: 24.52 KG/M2 | RESPIRATION RATE: 16 BRPM | TEMPERATURE: 96.5 F | OXYGEN SATURATION: 93 % | SYSTOLIC BLOOD PRESSURE: 122 MMHG | HEART RATE: 88 BPM | WEIGHT: 185 LBS | DIASTOLIC BLOOD PRESSURE: 79 MMHG | HEIGHT: 73 IN

## 2025-05-16 DIAGNOSIS — F19.90 SUBSTANCE USE DISORDER: Primary | ICD-10-CM

## 2025-05-16 PROCEDURE — 99283 EMERGENCY DEPT VISIT LOW MDM: CPT | Performed by: EMERGENCY MEDICINE

## 2025-05-16 PROCEDURE — 99282 EMERGENCY DEPT VISIT SF MDM: CPT | Performed by: EMERGENCY MEDICINE

## 2025-05-16 PROCEDURE — 99284 EMERGENCY DEPT VISIT MOD MDM: CPT

## 2025-05-16 ASSESSMENT — PAIN SCALES - GENERAL: PAINLEVEL_OUTOF10: 0 - NO PAIN

## 2025-05-16 ASSESSMENT — PAIN - FUNCTIONAL ASSESSMENT: PAIN_FUNCTIONAL_ASSESSMENT: 0-10

## 2025-05-16 NOTE — PROGRESS NOTES
Emergency Department Transition of Care Note       Signout   I received Lazaro De La Torre in signout from Dr. Siddiqui.  Please see the ED Provider Note for all HPI, PE and MDM up to the time of signout at 0700.  This is in addition to the primary record.    In brief Lazaro De La Torre is an 39 y.o. male presenting for substance use disorder with hopes to detox    At the time of signout we were awaiting:  Evaluation by [facility]    ED Course & Medical Decision Making   Medical Decision Making:  Under my care, patient made hemodynamically stable.  [facility] evaluated, they were able to find a place for the patient to go as a detox center.  This was communicated with the patient who was agreeable.  Patient discharged in stable condition.    ED Course:  Diagnoses as of 05/16/25 1151   Substance use disorder       Disposition   As a result of the work-up, the patient was discharged home.  he was informed of his diagnosis and instructed to come back with any concerns or worsening of condition.  he and was agreeable to the plan as discussed above.  he was given the opportunity to ask questions.  All of the patient's questions were answered.    Procedures   Procedures    Travis Rangel DO  Emergency Medicine

## 2025-05-16 NOTE — DISCHARGE INSTRUCTIONS
Please contact one of the following providers for follow up substance abuse care:     Al-Anon and Al-ATeen [address] [facility] [address] [place] [address] Tel: [phone]  Alcoholics Anonymous [address] [place] [address] Tel: [phone]  [facility] [address] [place] [address] Tel: [phone]  [facility] [address] [facility] Tel: [phone]  [facility] ([place]) [address] [place] [address] Tel: [phone]  [facility] ([place]) [address] [facility] [place] [address] Tel: [phone]  [facility] ( Services) [address] [place] [address] Tel: [phone]  [facility] [address] [place] [address] Tel: [phone]  [facility] [address] [place] [address] Tel: [phone]  Families Anonymous Tel: [phone]  [facility] [address] [place] [address] Tel: [phone]  [facility] [address] [place] [address] Tel: [phone]  [facility] [address] [place] [address] Tel: [phone]  [facility] (Resident Treatment) [address] [place] [address] Tel: [phone]  [facility] [address] [place] [address] Tel: [phone]  [facility] [address] [place] [address] Tel: [phone]  [facility] [address], [place] [address] Tel: [phone]  Hayden Cabrera [address] [place] [address] Tel: [phone]  [facility] [address] [place] [address] Tel: [phone]  [facility] [address] [place] [address] Tel: [phone]  [facility] [address], [place] [address] Tel: [phone]  [facility] [address] [place] [address] Tel: [phone]  [facility] [address]  [address] Tel: [phone]  [facility] [address] [place] [address] Tel: [phone]  [facility] [address] [place] [address] Tel: [phone]  [facility] [address] [place], [place] Tel: [phone]  [facility] [address] [place] [address] Tel: [phone]  [facility] [address] [place], [place] [address] Tel: [phone]  [facility] ([place]) [address], [place] [address] Tel: [phone]  [facility] Tel: [phone]  Audra Elizabeth [address] [place] [address] Tel: [phone]  [facility] [address] [place] [address] Tel: [phone]  [facility] [address] [place], [place]  [facility] [address] [place] [address] Tel: [phone]  [facility] [address] [place] [address] Tel: [phone] (East) [phone] (West)     Please contact one of the following providers for follow up mental health care:     [facility] Tel: [phone]  [facility] [address] [place], [place] Tel: [phone]  [facility] [address] [place], [place] Tel: [phone]  [facility] [facility] [address] [place], [place] Tel: [phone]  [facility] [address] [place], [place] Tel: [phone]  [facility] [address] [facility] Tel: [phone]  [facility] [email or web address] [address] [address] [place] Tel: [phone]  [facility] [address] [place], [place] [address] [place] Tel:[phone]  [facility] [address] Tel:[phone]  [facility] [address] [place], [place] Tel: [phone]  [facility] [address] [place], [place] Tel: [phone]  [place], [place] Tel:  [phone]  [facility] [address] [place], [place] Tel: [phone]  [facility] [address] [place], [place] Tel: [phone]  Arslan Person [address] [address] [facility] [address] [facility] [address] [place], [place] Tel: [phone]  [facility] [address] [place], [place] Tel: [phone]  [facility] [address] Office Tel: [phone]  [facility] [address] Tel: [phone]  [facility] [address] [place], [place] Tel: [phone]  [facility] [address] [place], [place] Tel: [phone]  [facility] [address] [facility] Tel: [phone]     Community Resources for Chemical Dependency & Addictions    Self Help Groups  Alcoholics Anonymous [phone]) [phone]  Narcotics Anonymous (992) 741-8903  Cocaine Anonymous (592) 818-1313 or (034) 609-8414  [facility] ([phone]) [phone]  Sex & Love Addicts Anonymous (987) 615-7068  [place] Problem [facility]ambling Helpline (903) 304-7202  Families Anonymous (201) 207-8264  AA Clubs (Possible after five contacts)  Club 24 (950) 954-1578  Night & Day ([phone]) [phone]  [facility] (832) [phone]    Detox Programs  [place] ([phone]  [facility]opher Anne (963) [phone], x4 or ([phone] Inpatient opiates ([facility], medicaid and medicare, no [place])  Vickie [phone]  Audra Elizabeth [phone]  [place] [facility] [phone]  [facility] (728) [phone]  [facility] ([place]) [phone]  [facility] [phone]  [facility] (non-narcotic ambulatory detox) [place] ([phone]) 439-4000, [place] [phone] [place] residents only, medicaids, no medicare     Male Residential Treatment Programs  [place] (207) [phone]  [facility]ickie (accepts Medicare) ([phone]) [phone]  [facility] [phone]  Hayden Cee ([place]) [phone] [place]  ([phone]  [phone]  Alex Casillasa [phone]  [facility] ([phone]  [facility] [phone]) [phone] 90 day residential (phase 1) and sober living (phase 2)  [facility] (701) [phone] 90 day residential  [facility] ([facility]) (241) [phone]  [facility] (683) [phone] [facility] residents only  [facility] [phone]  [facility]iet [place] ([phone]  [facility] (detox, too) [phone] [place], [place] (near [employer])     Female Residential Treatment Programs  [facility] [phone]  [place] (accepts Medicare/Uninsured) (711) 725-7962 Ask for Sanna, can have her paged, allow methadone/suboxone, take out-of-county  [facility] [phone]  [place] (take uninsured) (181) [phone]  [facility] ([address] and [place]) (647) [phone]  [facility] ([phone]) [phone]  Alex Sanchez [phone]  [place] ([phone]) [phone] Medicaid/Medicare. Will take pregnant opiate patients.  [facility] ([phone] [facility] residents only  [facility] ([phone]  [facility] ([phone]  Elva [place] [phone]  [facility] ([phone]  [facility] ([facility]) [phone]  [facility] (detox, too) [phone] [place], [place] (near [employer])     Methadone Treatment Programs  [facility] [phone]  [facility] ([phone]    Additional Outpatient Resources Can Be Obtained From:  First Call for Help *211 or (005) [phone] Ask for drug treatment info.  [facility] [phone]  [facility] [phone]        [facility] [phone] [phone] [facility] [place], [facility] by [facility], [facility] [phone]  [facility] [place] [phone]  24 Hour Info Line (831) [phone] or (192) [phone]      Education/Support Group/Interventionists  [facility] and "[employer])", [facility] [phone] Education group meets every other Monday from 5 - 5:50pm.  Family group meets every other Monday from 6 - 730pm.  Tammy Carlos (542)  [phone] www.[employer]      Suboxone Maintenance  [facility] [phone] ([place])  ([phone] ([place])  ([phone] ([facility]  [facility] [phone] - Rizwan [place] Accepts all Medicaids and private insurances

## 2025-05-16 NOTE — ED NOTES
Lazaro De La Torre was assessed by a Substance Use Navigator Specialist (SUNS) at 10:00 AM.     Contact Number obtained during encounter: N/A; pt does not have a phone at this time.    Medical History: Medical History[1]     Psychiatric History: Not on file.     Social History:   Social History     Socioeconomic History    Marital status: Single     Spouse name: Not on file    Number of children: Not on file    Years of education: Not on file    Highest education level: Not on file   Occupational History    Not on file   Tobacco Use    Smoking status: Not on file    Smokeless tobacco: Not on file   Substance and Sexual Activity    Alcohol use: Not on file    Drug use: Not on file    Sexual activity: Not on file   Other Topics Concern    Not on file   Social History Narrative    Not on file     Social Drivers of Health     Financial Resource Strain: Not on File (2023)    Received from [employer]    Financial Resource Strain     Financial Resource Strain: 0   Food Insecurity: Not on File (2024)    Received from [employer]    Food Insecurity     Food: 0   Transportation Needs: Not on File (2023)    Received from [employer]    Transportation Needs     Transportation: 0   Physical Activity: Not on File (2023)    Received from [employer]    Physical Activity     Physical Activity: 0   Stress: Not on File (2023)    Received from [employer]    Stress     Stress: 0   Social Connections: Not on File (2024)    Received from [employer]    Social Connections     Connectedness: 0   Intimate Partner Violence: Not on file   Housing Stability: Not on File (2023)    Received from [employer]    Housing Stability     Housin     Substance(s) used:     - Crack cocaine.    Brief Summary of Assessment:     - Med/psych clear via clinical staff; pt appropriate and agreeable for inpatient treatment at [facility] facility.     Diagnosis / Diagnostic Impression:     - Crack cocaine use.     Summary / Plan:    - [facility] and [facility]S [facility] ([address] [place] [address]).    Patient interested in treatment: Yes    Disposition:     - Discharged.   - [facility]ed to [facility].     Transportation Provided: Yes (transportation provided by [facility]).    Jason Raphael, EMT-Paramedic  [facility] (Substance Use Navigation Specialist)  [email or web address]  Ph: [phone].        [1] History reviewed. No pertinent past medical history.       Jason Raphael, JITENDRA  05/16/25 8582

## 2025-05-16 NOTE — ED PROVIDER NOTES
History of Present Illness     History provided by: Patient  Limitations to History: None  External Records Reviewed with Brief Summary: Prior ED notes for patient was seen for drug addiction    HPI:  Lazaro De La Torre is a 39 y.o. male with past medical history of substance use disorder who is presenting today with worsening substance use.  Patient reports that he has been using crack cocaine for the last 3 days.  He has not been eating for the last 3 days secondary to the crack cocaine use.  He reports that he has been unable to shower and has been living in the streets for the last few days.  He reports that he is now becoming more depressed.  He reports that he has been off of his psychiatric medications.  He does not know what those are.  He reports the diversion center is the one that prescribed him to them.  He is denying SI, HI, auditory visual hallucinations.  He is requesting rehab treatment    Physical Exam   Triage vitals:  T 35.8 °C (96.5 °F)  HR 90  BP (!) 134/94  RR 16  O2 97 % None (Room air)    General: Awake, alert, in no acute distress  Eyes: Gaze conjugate.  No scleral icterus or injection  HENT: Normo-cephalic, atraumatic. No stridor  CV: Regular rate, regular rhythm. Radial pulses 2+ bilaterally  Resp: Breathing non-labored, speaking in full sentences.  Clear to auscultation bilaterally  GI: Soft, non-distended,   MSK/Extremities: No gross bony deformities. Moving all extremities  Skin: Warm. Appropriate color  Neuro: Alert. Oriented. Face symmetric. Speech is fluent.  Gross strength and sensation intact in b/l UE and LEs  Psych: Appropriate mood and affect      Medical Decision Making & ED Course   Medical Decision Makin y.o. male  with past medical history of substance use disorder who is presenting today with worsening substance use.  Vital signs notable for mild hypertension.  On exam, patient was well-appearing.  Patient is currently denying SI, HI, auditory visualizations.  Patient  is requesting treatment for psychiatric disease but does not know what medication to take.  He reports the diversion center knows.  He is also interested in rehab.  He is currently reporting that he does not want to go to the diversion center.  He is requesting specific facilities.  He is relatively low risk and does not require psychiatric evaluation given that he is denying SI, HI, hallucinations. Patient was signed out to incoming provider pending [facility] placement.     ----      Differential diagnoses considered include but are not limited to: Please see MDM.     Social Determinants of Health which Significantly Impact Care: Substance use disorder The following actions were taken to address these social determinants: Patient offered evaluation by [facility]    EKG Independent Interpretation: If an EKG interpretation is available. Please see ED Course and MDM for full interpretation.    Independent Result Review and Interpretation: Results were independently reviewed and interpreted by myself. Please see ED course and MDM for full interpretation.    Chronic conditions affecting the patient's care: As documented in the MDM    The patient was discussed with the following consultants/services: None    Care Considerations: As per [facility]    ED Course:  Diagnoses as of 05/16/25 0420   Substance use disorder     Disposition   Patient was signed out to Dr Barboza at 7 am pending completion of their work-up.  Please see the next provider's transition of care note for the remainder of the patient's care.     Procedures   Procedures    Patient seen and discussed with ED attending physician.    Shweta Siddiqui MD  Emergency Medicine     Shweta Siddiqui MD  Resident  05/16/25 8922

## 2025-05-16 NOTE — ED TRIAGE NOTES
Pt states that his tremors are getting worse in his left hand and he would like to see [facility]. Pt also states that he need his medication for depression.